# Patient Record
Sex: FEMALE | Race: WHITE
[De-identification: names, ages, dates, MRNs, and addresses within clinical notes are randomized per-mention and may not be internally consistent; named-entity substitution may affect disease eponyms.]

---

## 2021-04-15 ENCOUNTER — HOSPITAL ENCOUNTER (EMERGENCY)
Dept: HOSPITAL 94 - ER | Age: 46
Discharge: LEFT BEFORE DISCHARGE | End: 2021-04-15
Payer: SELF-PAY

## 2021-04-15 VITALS — HEIGHT: 66 IN | WEIGHT: 150.31 LBS | BODY MASS INDEX: 24.16 KG/M2

## 2021-04-15 VITALS — SYSTOLIC BLOOD PRESSURE: 146 MMHG | DIASTOLIC BLOOD PRESSURE: 87 MMHG

## 2021-04-15 DIAGNOSIS — Z53.21: ICD-10-CM

## 2021-04-15 DIAGNOSIS — M79.674: Primary | ICD-10-CM

## 2021-04-15 NOTE — NUR
PER TRIAGE RNDYLAN; PT NO LONGER WANTED TO WAIT AND DECIDED TO LEAVE 
WITHOUT BEING SEEN. ATTEMPTED TO CALL PT, NO ANSWER TO PHONE CALL.

## 2024-05-07 ENCOUNTER — HOSPITAL ENCOUNTER (OUTPATIENT)
Dept: HOSPITAL 94 - RAD | Age: 49
Discharge: HOME | End: 2024-05-07
Payer: MEDICAID

## 2024-05-07 DIAGNOSIS — Z02.1: Primary | ICD-10-CM

## 2024-05-07 PROCEDURE — 71046 X-RAY EXAM CHEST 2 VIEWS: CPT
